# Patient Record
(demographics unavailable — no encounter records)

---

## 2024-10-21 NOTE — HISTORY OF PRESENT ILLNESS
[Disease:__________________________] : Disease: [unfilled] [de-identified] : Mr. 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 There is at 5his time undocumented history of presumptively low grade NHL diagnosed in 2005, initially followed without therapy [place] [facility] [place] [facility] ([place]). He has not been seen there since 2008.  He has a h/o HTN, COPD, T2DM, treated hepatitis C, hypothyroidism, depression. He was admitted to [facility] 6/3/24 with worsening OVALLES and  was found to be in new onset afib w/ RVR. CTA chest, A/P showed- small LLL nodules, likely intrapulmonary LN numerous enlarged left supraclavicular, subpectoral and axillary LN - compared to 2/2023 numerous prominent/enlarged right paratracheal & para esophageal nodes, similar to prior numerous LN throughout the abdomen & pelvis, for ex. the largest celiac axis node measures 6 x 3.8 cm, multiple RP nodes,  left external iliac nodes. PET/CT 7/5/2024 - numerous hypermetabolic nodes above & below the diaphragm c/w malignant involvement. - left pleural effusion obscuring at least one hypermetabolic pulm nodule in the postero medial LLL - new pericardial effusion w/ diffuse mild-moderate uptake, malignant effusion is not excluded -> consider thoracentesis and/or pericardiocentesis - spleen contains an intense focus of hypermetabolism at its anterior portion, suspicious for malignant involvement - hypermetabolic soft tissue density at the lateral aspect of the left border of T11 appears to be indenting the cortex, raising suspicious for malignancy -> this may be extra brent disease involving the skeleton, further imaging or bx may be helpful  s/p EBUS 7/16/2024 - level 7 LN - atypical findings, interfollicular proliferation. TCR-gamma rearr indeterminate. - 4R lymph node - negative for carcinoma - 11R lymph node, inferior - negative for carcinoma  He was seen by Dr. Link and had surgical sampling of left axillary nodes on 9/13/24: 1.  Left axillary lymph node, part 1: -  Diffuse large B cell lymphoma with high proliferation index, non- germinal center B-cell-like immunophenotype  2. Left axillary lymph node, part 2: - Partial  involvement  by diffuse large B cell lymphoma with high proliferation index, non- germinal center B-cell-like immunophenotype  3. Left axillary lymph node, part 1: - Partial  involvement  by diffuse large B cell lymphoma with high proliferation index, non- germinal center B-cell-like immunophenotype IHC:  Positive:  CD20, PAX5, CD79a, BCL6, BCL2 (weak), MUM1 (weak, subset), MYC (30%), p53 (some strongly positive cells are present Negative:   CD3, CD5, CD10, CD30, CyclinD1, MNDA, LEF1,  PATY, AE1/3 Other:   Ki67 proliferation index is upto 80% in neoplastic cells  He had cardiology clearance before the axillary node biopsy: Echocardiogram (8/2024) consistent with normal LVEF 55-60%, normal RV systolic function, normal PA pressures and no pericardial effusion. Nuclear stress test (6/2024) consistent with normal myocardial perfusion and normal post stress LVEF.  His pulm status has improved. He has not had fevers. He has had fluctuating weight, with recent weight loss attributable  according to patient to use of ozempic. He has at times had drenching night sweats.  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 He describes therapy for hep c genotype 2 in the 1980's with IFN and ribavirin. He does not recall who followed him at [facility] for low grade NHL.   [de-identified] : III ? IV [de-identified] : DLBCL, non-GCB type [de-identified] : here today with his sister Randi to discuss treatment with Polivy RCHP with Onpro .  new Diagnosis of DLBCL - here today in preparation of starting treatment with RCHP and Polivy met with patient reviewed all lab results including Hep core B antibody positive , aware that he will require suppression with Vemildy for course of treatment and for a time after. all diagnostic test discussed. Met with patient to provide education on chemotherapy treatment plan. Patient to begin treatment with (RCHP and polivy every 21 days ). Discussed current planned duration of treatment and schedule of treatment administration including how to maintain treatment schedule, laboratory schedule and office follow up schedule with procedure and contact numbers for cancelling or rescheduling. Reviewed handout outlining potential side effects and how to manage at home. Reviewed supportive care regimen including use of antiemetics, skin care, antidiarrheal (when applicable), mouth care and maintaining adequate hydration. Discussed self-care management including safe handling of body secretions, waste, and safe handling of medications. Provided contact information for (name of MD) team, including non-office hours information, and encouraged patient to call with any questions or concerns at any time. Provided number to [facility] (or 911) in case of medical emergency that requires immediate attention. Patient (and family member) verbalized understanding of instructions and questions answered. Provided patient with handouts including treatment plan, drug information, potential side effects and management while on treatment, important contact information and what to expect.  patient is Diabetic, on oral medication on Ozempic (not taking recently) asked to call MD that manages his DM with directions what to do on prednisone. He is aware Prednisone can raise his BS.. he is a smoker about 1 pack a week since age 20. also drinks a bottle wine weekly, he was asked to minimize both Pt is , HCP discussed not ready to be engaged. paperwork given for review . prescriptions sent to pharmacy.

## 2024-10-21 NOTE — PHYSICAL EXAM
[Fully active, able to carry on all pre-disease performance without restriction] : Status 0 - Fully active, able to carry on all pre-disease performance without restriction [Normal] : affect appropriate [de-identified] : adenopathy cervical and right inguinasl area  [de-identified] : Rt side mediport clean and dry

## 2024-10-21 NOTE — REASON FOR VISIT
[Lymphoma] : lymphoma [Family Member] : family member [Initial Consultation] : an initial consultation for [FreeTextEntry2] : DLBCL

## 2024-10-21 NOTE — HISTORY OF PRESENT ILLNESS
[Disease:__________________________] : Disease: [unfilled] [de-identified] : Mr. 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 There is at 5his time undocumented history of presumptively low grade NHL diagnosed in 2005, initially [place] [facility] [place] [facility] ([facility]). He has not been seen there since 2008.  He has a h/o HTN, COPD, T2DM, treated hepatitis C, hypothyroidism, depression. He was admitted to [facility] 6/3/24 with worsening OVALLES and  was found to be in new onset afib w/ RVR. CTA chest, A/P showed- small LLL nodules, likely intrapulmonary LN numerous enlarged left supraclavicular, subpectoral and axillary LN - compared to 2/2023 numerous prominent/enlarged right paratracheal & para esophageal nodes, similar to prior numerous LN throughout the abdomen & pelvis, for ex. the largest celiac axis node measures 6 x 3.8 cm, multiple RP nodes,  left external iliac nodes. PET/CT 7/5/2024 - numerous hypermetabolic nodes above & below the diaphragm c/w malignant involvement. - left pleural effusion obscuring at least one hypermetabolic pulm nodule in the postero medial LLL - new pericardial effusion w/ diffuse mild-moderate uptake, malignant effusion is not excluded -> consider thoracentesis and/or pericardiocentesis - spleen contains an intense focus of hypermetabolism at its anterior portion, suspicious for malignant involvement - hypermetabolic soft tissue density at the lateral aspect of the left border of T11 appears to be indenting the cortex, raising suspicious for malignancy -> this may be extra brent disease involving the skeleton, further imaging or bx may be helpful  s/p EBUS 7/16/2024 - level 7 LN - atypical findings, interfollicular proliferation. TCR-gamma rearr indeterminate. - 4R lymph node - negative for carcinoma - 11R lymph node, inferior - negative for carcinoma  He was seen by Dr. Link and had surgical sampling of left axillary nodes on 9/13/24: 1.  Left axillary lymph node, part 1: -  Diffuse large B cell lymphoma with high proliferation index, non- germinal center B-cell-like immunophenotype  2. Left axillary lymph node, part 2: - Partial  involvement  by diffuse large B cell lymphoma with high proliferation index, non- germinal center B-cell-like immunophenotype  3. Left axillary lymph node, part 1: - Partial  involvement  by diffuse large B cell lymphoma with high proliferation index, non- germinal center B-cell-like immunophenotype IHC:  Positive:  CD20, PAX5, CD79a, BCL6, BCL2 (weak), MUM1 (weak, subset), MYC (30%), p53 (some strongly positive cells are present Negative:   CD3, CD5, CD10, CD30, CyclinD1, MNDA, LEF1,  PATY, AE1/3 Other:   Ki67 proliferation index is upto 80% in neoplastic cells  He had cardiology clearance before the axillary node biopsy: Echocardiogram (8/2024) consistent with normal LVEF 55-60%, normal RV systolic function, normal PA pressures and no pericardial effusion. Nuclear stress test (6/2024) consistent with normal myocardial perfusion and normal post stress LVEF.  His pulm status has improved. He has not had fevers. He has had fluctuating weight, with recent weight loss attributable  according to patient to use of ozempic. He has at times had drenching night sweats.  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 He describes therapy for hep c genotype 2 in the 1980's with IFN and ribavirin. He does not recall who followed him at [facility] for [place] grade NHL.   [de-identified] : III ? IV [de-identified] : DLBCL, non-GCB type [de-identified] : here today with his sister Randi to discuss treatment with Polivy RCHP with Onpro .  new Diagnosis of DLBCL - here today in preparation of starting treatment with RCHP and Polivy met with patient reviewed all lab results including Hep core B antibody positive , aware that he will require suppression with Vemildy for course of treatment and for a time after. all diagnostic test discussed. Met with patient to provide education on chemotherapy treatment plan. Patient to begin treatment with (RCHP and polivy every 21 days ). Discussed current planned duration of treatment and schedule of treatment administration including how to maintain treatment schedule, laboratory schedule and office follow up schedule with procedure and contact numbers for cancelling or rescheduling. Reviewed handout outlining potential side effects and how to manage at home. Reviewed supportive care regimen including use of antiemetics, skin care, antidiarrheal (when applicable), mouth care and maintaining adequate hydration. Discussed self-care management including safe handling of body secretions, waste, and safe handling of medications. Provided contact information for (name of MD) team, including non-office hours information, and encouraged patient to call with any questions or concerns at any time. Provided number to [facility] (or 911) in case of medical emergency that requires immediate attention. Patient (and family member) verbalized understanding of instructions and questions answered. Provided patient with handouts including treatment plan, drug information, potential side effects and management while on treatment, important contact information and what to expect.  patient is Diabetic, on oral medication on Ozempic (not taking recently) asked to call MD that manages his DM with directions what to do on prednisone. He is aware Prednisone can raise his BS.. he is a smoker about 1 pack a week since age 20. also drinks a bottle wine weekly, he was asked to minimize both Pt is , HCP discussed not ready to be engaged. paperwork given for review . prescriptions sent to pharmacy.

## 2024-10-21 NOTE — ASSESSMENT
[Curative] : Goals of care discussed with patient: Curative [Palliative Care Plan] : not applicable at this time [Patient/Caregiver not ready to engage] : Patient/Caregiver not ready to engage [FreeTextEntry1] : 76 yo man with stage III, DLBCL, non-GCB type. Ki-67 is high, expression of BCL6, weak BCL2, MYC (30%), P53 present. He has comorbidities but has adequate cardiac function. Studies for MYC, BCL2 and BCL6 rearrangement are pending. S/P placement of a Medioport. Randomized data suggest an advantage for treatment with davide R-CHP over R-CHOP particularly in patients with non-GCB histology, high IPI and age over 60 without a clear increase in toxicity. BMA/Bx did not show evidence of Lymphoma Risks, benefits, toxicities of treatment with davide, R-CHP were reviewed with Mr. Martinez and his sister and all questions were answered.  He will need anti-viral ppx vs hep B reactivation once therapy - prescription sent to pharmacy for Vemlidy 25 mg one daily prescriptions sent to pharmacy for allopurinol 300 mg daily x 7 days start day before IV chemo first and second cycle only , prednisone 50 mg 2 tablets daily x 5 days with each IV chemo , Reglan 10 mg every 6 hours as needed for N/V. to follow up 7-10 days after IV chemo . follow  CMP, LDH . Patient asked to discussed with PCP diabetic treatment especially with Prednisone therapy.   [AdvancecareDate] : `10/21/24

## 2024-10-21 NOTE — PHYSICAL EXAM
[Fully active, able to carry on all pre-disease performance without restriction] : Status 0 - Fully active, able to carry on all pre-disease performance without restriction [Normal] : affect appropriate [de-identified] : adenopathy cervical and right inguinasl area  [de-identified] : Rt side mediport clean and dry

## 2024-12-09 NOTE — REVIEW OF SYSTEMS
[Fatigue] : fatigue [Recent Change In Weight] : ~T recent weight change [Diarrhea: Grade 1 - Increase of <4 stools per day over baseline; mild increase in ostomy output compared to baseline] : Diarrhea: Grade 1 - Increase of <4 stools per day over baseline; mild increase in ostomy output compared to baseline [Negative] : Heme/Lymph [FreeTextEntry3] : RT EYE ENUCLERATION [FreeTextEntry5] : TACY [FreeTextEntry7] : DIARRHEA [de-identified] : ALERT TO PLACE PERSON [de-identified] : BS ELEVATED

## 2024-12-09 NOTE — HISTORY OF PRESENT ILLNESS
[de-identified] : Mr. 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 There is at 5his time undocumented history of presumptively low grade NHL diagnosed in 2005, initially followed without therapy at [facility] in [facility] (2005). He has not been seen there since 2008.  He has a h/o HTN, COPD, T2DM, treated hepatitis C, hypothyroidism, depression. He was admitted to [facility] 6/3/24 with worsening OVALLES and  was found to be in new onset afib w/ RVR. CTA chest, A/P showed- small LLL nodules, likely intrapulmonary LN numerous enlarged left supraclavicular, subpectoral and axillary LN - compared to 2/2023 numerous prominent/enlarged right paratracheal & para esophageal nodes, similar to prior numerous LN throughout the abdomen & pelvis, for ex. the largest celiac axis node measures 6 x 3.8 cm, multiple RP nodes,  left external iliac nodes. PET/CT 7/5/2024 - numerous hypermetabolic nodes above & below the diaphragm c/w malignant involvement. - left pleural effusion obscuring at least one hypermetabolic pulm nodule in the postero medial LLL - new pericardial effusion w/ diffuse mild-moderate uptake, malignant effusion is not excluded -> consider thoracentesis and/or pericardiocentesis - spleen contains an intense focus of hypermetabolism at its anterior portion, suspicious for malignant involvement - hypermetabolic soft tissue density at the lateral aspect of the left border of T11 appears to be indenting the cortex, raising suspicious for malignancy -> this may be extra brent disease involving the skeleton, further imaging or bx may be helpful  s/p EBUS 7/16/2024 - level 7 LN - atypical findings, interfollicular proliferation. TCR-gamma rearr indeterminate. - 4R lymph node - negative for carcinoma - 11R lymph node, inferior - negative for carcinoma  He was seen by Dr. Link and had surgical sampling of left axillary nodes on 9/13/24: 1.  Left axillary lymph node, part 1: -  Diffuse large B cell lymphoma with high proliferation index, non- germinal center B-cell-like immunophenotype  2. Left axillary lymph node, part 2: - Partial  involvement  by diffuse large B cell lymphoma with high proliferation index, non- germinal center B-cell-like immunophenotype  3. Left axillary lymph node, part 1: - Partial  involvement  by diffuse large B cell lymphoma with high proliferation index, non- germinal center B-cell-like immunophenotype IHC:  Positive:  CD20, PAX5, CD79a, BCL6, BCL2 (weak), MUM1 (weak, subset), MYC (30%), p53 (some strongly positive cells are present Negative:   CD3, CD5, CD10, CD30, CyclinD1, MNDA, LEF1,  PATY, AE1/3 Other:   Ki67 proliferation index is upto 80% in neoplastic cells  He had cardiology clearance before the axillary node biopsy: Echocardiogram (8/2024) consistent with normal LVEF 55-60%, normal RV systolic function, normal PA pressures and no pericardial effusion. Nuclear stress test (6/2024) consistent with normal myocardial perfusion and normal post stress LVEF.  His pulm status has improved. He has not had fevers. He has had fluctuating weight, with recent weight loss attributable  according to patient to use of ozempic. He has at times had drenching night sweats.  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 He describes therapy for hep c genotype 2 in the 1980's with IFN and ribavirin. He does not recall who followed him at [facility] for low grade NHL.   [de-identified] : III ? IV [de-identified] : DLBCL, non-GCB type [Treatment Protocol] : Treatment Protocol [Cycle: ___] : Cycle: [unfilled] [FreeTextEntry1] : 10/25 RCHP, polivy plus ONpro  CYCLE2 11/15 [de-identified] : Pt is a 75M w/ PMH of DM2, atrial tachycardia, COPD, DLBCL pw tachycardia and AMS. At out center today was planned for a transfusion when he was noted to have tachycardia to 150 c/f Afib and was thus sent to ED. He otherwise denies any CP, SOB, palpitations, abd pain, N/V, constipation/diarrhea, or urinary symptoms.  In the ED VSS w/ labs notable for pancytopenia and glucose of 422 and lactate of 3.4. He was subsequently noted to be febrile to 101 w/ tachycardia. Tele notable for sinus tach. He was initially administered 1L IVF, Admelog 6U, and Lopressor PO 50mg and ordered for 1U pRBC.   (22 Nov 2024 22:30) Patient was admitted for neutropenic fever with pancytopenia. admitted to tele for atrial tachy/afib RVR. started on emperic cefepime but changed to zosyn due to UCx with enterococcus though lack of symptoms and UA bland. BCx and CXR neg. Pt required 3 units PRBC during hosp stay for hgb<7. WBC and plt recovered without additional tx. Pt switched from zosyn to augmentin to complete 7 days course. pt started on increased dose metoprolol as per card to control HR. Echo showed new EF 40-45% previous normal possible sec to chemo vs tachycardia. no cliniical s+s of decompensated HF. pt will need follow up with card for GDMT optimization  Important Medication Changes and Reason: Augmentin for 2 days to complete 7 day total course of antibiotic. Active or Pending Issues Requiring Follow-up: anemia CHF Advanced Directives:

## 2024-12-09 NOTE — PHYSICAL EXAM
[Ambulatory and capable of all self care but unable to carry out any work activities] : Status 2- Ambulatory and capable of all self care but unable to carry out any work activities. Up and about more than 50% of waking hours [Normal] : affect appropriate [de-identified] : TACHYCARDIC [de-identified] : SOFT NT

## 2024-12-09 NOTE — HISTORY OF PRESENT ILLNESS
[de-identified] : Mr. 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 There is at 5his time undocumented history of presumptively low grade NHL diagnosed in 2005, initially followed without therapy [place] [facility] [place] [facility] (2005). He has not been seen there since 2008.  He has a h/o HTN, COPD, T2DM, treated hepatitis C, hypothyroidism, depression. He was admitted to [facility] 6/3/24 with worsening OVALLES and  was found to be in new onset afib w/ RVR. CTA chest, A/P showed- small LLL nodules, likely intrapulmonary LN numerous enlarged left supraclavicular, subpectoral and axillary LN - compared to 2/2023 numerous prominent/enlarged right paratracheal & para esophageal nodes, similar to prior numerous LN throughout the abdomen & pelvis, for ex. the largest celiac axis node measures 6 x 3.8 cm, multiple RP nodes,  left external iliac nodes. PET/CT 7/5/2024 - numerous hypermetabolic nodes above & below the diaphragm c/w malignant involvement. - left pleural effusion obscuring at least one hypermetabolic pulm nodule in the postero medial LLL - new pericardial effusion w/ diffuse mild-moderate uptake, malignant effusion is not excluded -> consider thoracentesis and/or pericardiocentesis - spleen contains an intense focus of hypermetabolism at its anterior portion, suspicious for malignant involvement - hypermetabolic soft tissue density at the lateral aspect of the left border of T11 appears to be indenting the cortex, raising suspicious for malignancy -> this may be extra brent disease involving the skeleton, further imaging or bx may be helpful  s/p EBUS 7/16/2024 - level 7 LN - atypical findings, interfollicular proliferation. TCR-gamma rearr indeterminate. - 4R lymph node - negative for carcinoma - 11R lymph node, inferior - negative for carcinoma  He was seen by Dr. Link and had surgical sampling of left axillary nodes on 9/13/24: 1.  Left axillary lymph node, part 1: -  Diffuse large B cell lymphoma with high proliferation index, non- germinal center B-cell-like immunophenotype  2. Left axillary lymph node, part 2: - Partial  involvement  by diffuse large B cell lymphoma with high proliferation index, non- germinal center B-cell-like immunophenotype  3. Left axillary lymph node, part 1: - Partial  involvement  by diffuse large B cell lymphoma with high proliferation index, non- germinal center B-cell-like immunophenotype IHC:  Positive:  CD20, PAX5, CD79a, BCL6, BCL2 (weak), MUM1 (weak, subset), MYC (30%), p53 (some strongly positive cells are present Negative:   CD3, CD5, CD10, CD30, CyclinD1, MNDA, LEF1,  PATY, AE1/3 Other:   Ki67 proliferation index is upto 80% in neoplastic cells  He had cardiology clearance before the axillary node biopsy: Echocardiogram (8/2024) consistent with normal LVEF 55-60%, normal RV systolic function, normal PA pressures and no pericardial effusion. Nuclear stress test (6/2024) consistent with normal myocardial perfusion and normal post stress LVEF.  His pulm status has improved. He has not had fevers. He has had fluctuating weight, with recent weight loss attributable  according to patient to use of ozempic. He has at times had drenching night sweats.  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 He describes therapy for hep c genotype 2 in the 1980's with IFN and ribavirin. He does not recall who followed him at [facility] for low grade NHL.   [de-identified] : III ? IV [de-identified] : DLBCL, non-GCB type [Treatment Protocol] : Treatment Protocol [Cycle: ___] : Cycle: [unfilled] [FreeTextEntry1] : 10/25 RCHP, polivy plus ONpro  CYCLE2 11/15 [de-identified] : Pt is a 75M w/ PMH of DM2, atrial tachycardia, COPD, DLBCL pw tachycardia and AMS. At out center today was planned for a transfusion when he was noted to have tachycardia to 150 c/f Afib and was thus sent to ED. He otherwise denies any CP, SOB, palpitations, abd pain, N/V, constipation/diarrhea, or urinary symptoms.  In the ED VSS w/ labs notable for pancytopenia and glucose of 422 and lactate of 3.4. He was subsequently noted to be febrile to 101 w/ tachycardia. Tele notable for sinus tach. He was initially administered 1L IVF, Admelog 6U, and Lopressor PO 50mg and ordered for 1U pRBC.   (22 Nov 2024 22:30) Patient was admitted for neutropenic fever with pancytopenia. admitted to tele for atrial tachy/afib RVR. started on emperic cefepime but changed to zosyn due to UCx with enterococcus though lack of symptoms and UA bland. BCx and CXR neg. Pt required 3 units PRBC during hosp stay for hgb<7. WBC and plt recovered without additional tx. Pt switched from zosyn to augmentin to complete 7 days course. pt started on increased dose metoprolol as per card to control HR. Echo showed new EF 40-45% previous normal possible sec to chemo vs tachycardia. no cliniical s+s of decompensated HF. pt will need follow up with card for GDMT optimization  Important Medication Changes and Reason: Augmentin for 2 days to complete 7 day total course of antibiotic. Active or Pending Issues Requiring Follow-up: anemia CHF Advanced Directives:

## 2024-12-09 NOTE — REVIEW OF SYSTEMS
[Fatigue] : fatigue [Recent Change In Weight] : ~T recent weight change [Diarrhea: Grade 1 - Increase of <4 stools per day over baseline; mild increase in ostomy output compared to baseline] : Diarrhea: Grade 1 - Increase of <4 stools per day over baseline; mild increase in ostomy output compared to baseline [Negative] : Heme/Lymph [FreeTextEntry3] : RT EYE ENUCLERATION [FreeTextEntry5] : TACY [FreeTextEntry7] : DIARRHEA [de-identified] : ALERT TO PLACE PERSON [de-identified] : BS ELEVATED

## 2024-12-09 NOTE — PHYSICAL EXAM
[Ambulatory and capable of all self care but unable to carry out any work activities] : Status 2- Ambulatory and capable of all self care but unable to carry out any work activities. Up and about more than 50% of waking hours [Normal] : affect appropriate [de-identified] : TACHYCARDIC [de-identified] : SOFT NT